# Patient Record
Sex: MALE | Race: WHITE | NOT HISPANIC OR LATINO | URBAN - METROPOLITAN AREA
[De-identification: names, ages, dates, MRNs, and addresses within clinical notes are randomized per-mention and may not be internally consistent; named-entity substitution may affect disease eponyms.]

---

## 2024-12-01 ENCOUNTER — OFFICE VISIT (OUTPATIENT)
Dept: URGENT CARE | Age: 64
End: 2024-12-01

## 2024-12-01 ENCOUNTER — ANCILLARY PROCEDURE (OUTPATIENT)
Dept: URGENT CARE | Age: 64
End: 2024-12-01

## 2024-12-01 VITALS
RESPIRATION RATE: 18 BRPM | HEART RATE: 82 BPM | DIASTOLIC BLOOD PRESSURE: 90 MMHG | OXYGEN SATURATION: 97 % | SYSTOLIC BLOOD PRESSURE: 150 MMHG | TEMPERATURE: 97.6 F

## 2024-12-01 DIAGNOSIS — S59.912A INJURY OF LEFT FOREARM AND WRIST, INITIAL ENCOUNTER: ICD-10-CM

## 2024-12-01 DIAGNOSIS — S69.92XA INJURY OF LEFT FOREARM AND WRIST, INITIAL ENCOUNTER: ICD-10-CM

## 2024-12-01 DIAGNOSIS — S52.602A CLOSED FRACTURE OF DISTAL END OF LEFT ULNA, UNSPECIFIED FRACTURE MORPHOLOGY, INITIAL ENCOUNTER: Primary | ICD-10-CM

## 2024-12-01 PROCEDURE — 73090 X-RAY EXAM OF FOREARM: CPT | Mod: LEFT SIDE | Performed by: PHYSICIAN ASSISTANT

## 2024-12-01 ASSESSMENT — ENCOUNTER SYMPTOMS
FEVER: 0
NUMBNESS: 0
JOINT SWELLING: 1
CHILLS: 0
ARTHRALGIAS: 1

## 2024-12-01 NOTE — PROGRESS NOTES
Subjective   Patient ID: Mikhail Nick is a 64 y.o. male. They present today with a chief complaint of Injury (Pt advised that he injured his left wrist 4 days prior. Pt advised no other injuries and denied prescriptions blood thinners. Pt did have previous injury wit surgery on his left wrist.).    History of Present Illness  Patient presents for left forearm/wrist injury. He states that he fell 4 days ago and injury his left arm. He doesn't recall how he landed, but has noticed bruising and swelling to his forearm. He states he has no pain at rest. He does feel pain to his ulnar wrist with turning movements of his wrist.  He denies laceration, numbness or tingling. He has chronically slightly decreased ROM after his prior crush injury in 2008 or 2009. He states this was surgically repaired with a heather. He is mainly worried that he did damage to the prior surgery.           Past Medical History  Allergies as of 12/01/2024    (No Known Allergies)       (Not in a hospital admission)       No past medical history on file.    Past Surgical History:   Procedure Laterality Date    TONSILLECTOMY  11/13/2014    Tonsillectomy With Adenoidectomy            Review of Systems  Review of Systems   Constitutional:  Negative for chills and fever.   Musculoskeletal:  Positive for arthralgias (left wrist) and joint swelling (left forearm).   Skin:         Positive for bruising and swelling, negative for laceration     Neurological:  Negative for numbness.                                  Objective    Vitals:    12/01/24 0923 12/01/24 1016   BP: (!) 165/92 150/90   Pulse: 82    Resp: 18    Temp: 36.4 °C (97.6 °F)    SpO2: 97%      No LMP for male patient.    Physical Exam  Vitals and nursing note reviewed.   Constitutional:       General: He is not in acute distress.  Musculoskeletal:      Left forearm: Swelling and edema present. No deformity, lacerations or bony tenderness.      Left wrist: Swelling and deformity present. No  lacerations or snuff box tenderness. Decreased range of motion. Normal pulse.      Comments: Patient has slightly reduced ROM in left wrist, though notes this is his baseline. He admits to pain to distal ulna with twisting movements of wrist.   Skin:     General: Skin is warm.      Capillary Refill: Capillary refill takes less than 2 seconds.   Neurological:      Mental Status: He is alert.      Comments: Sensation is intact to light touch to left forearm/wrist/hand         Procedures    Point of Care Test & Imaging Results from this visit  No results found for this visit on 12/01/24.   XR forearm left 2 views    Result Date: 12/1/2024  Interpreted By:  Home Whittington, STUDY: XR FOREARM LEFT 2 VIEWS; 12/1/2024 9:53 am   INDICATION: Fall, rule out fracture. Prior history of fracture repaired with heather.   COMPARISON: None.   ACCESSION NUMBER(S): [id number]   ORDERING CLINICIAN: PA MARTINEZ   TECHNIQUE: Two views of the  forearm were performed.   FINDINGS: Bones: The bones appear intact. Comminuted fracture distal ulna. No significant displacement. Screw fixation distal radius complete healing.   Joints: Intact.   Soft tissues: Unremarkable.       Distal ulna comminuted fracture may be acute, correlate with physical exam localized tenderness.   Signed by: Home Whittington 12/1/2024 10:11 AM Dictation workstation:   [id number]     Diagnostic study results (if any) were reviewed by Pa Martinez PA-C.    Assessment/Plan   Allergies, medications, history, and pertinent labs/EKGs/Imaging reviewed by Pa Martinez PA-C.     Medical Decision Making  MDM- History, examination, and XRAY consistent with distal ulna fracture (unsure if acute or chronic). Neurovascular status is intact. Fracture does not need to be reduced at this time.  Patient will be splinted with prefab DME, and referred to Ortho. Patient advised to present in ED if symptoms change or worsen.  Patient verbalized understanding and agrees with plan.        Orders and Diagnoses  Diagnoses and all orders for this visit:  Closed fracture of distal end of left ulna, unspecified fracture morphology, initial encounter  -     Referral to Orthopaedic Surgery; Future  Injury of left forearm and wrist, initial encounter  -     XR forearm left 2 views; Future      Medical Admin Record      Patient disposition: Home    Electronically signed by Vangie Hamilton PA-C  10:34 AM

## 2024-12-04 ENCOUNTER — OFFICE VISIT (OUTPATIENT)
Dept: ORTHOPEDIC SURGERY | Facility: HOSPITAL | Age: 64
End: 2024-12-04

## 2024-12-04 VITALS — WEIGHT: 167 LBS | BODY MASS INDEX: 22.13 KG/M2 | HEIGHT: 73 IN

## 2024-12-04 DIAGNOSIS — S52.602A CLOSED FRACTURE OF DISTAL END OF LEFT ULNA, UNSPECIFIED FRACTURE MORPHOLOGY, INITIAL ENCOUNTER: ICD-10-CM

## 2024-12-04 PROCEDURE — 99203 OFFICE O/P NEW LOW 30 MIN: CPT | Performed by: SPECIALIST

## 2024-12-04 PROCEDURE — 99213 OFFICE O/P EST LOW 20 MIN: CPT | Performed by: SPECIALIST

## 2024-12-04 PROCEDURE — 3008F BODY MASS INDEX DOCD: CPT | Performed by: SPECIALIST

## 2024-12-04 NOTE — PROGRESS NOTES
Closed fracture of distal end of left ulna, XR done 12/01/24  Patient fell 11/27/24 while in his bathroom causing injury to the wrist   Patient did have previous surgery in 2008 on the left wrist.  Patient many years ago had a fracture to the left distal radius but stated he had no pain or functional issues with the wrist prior to this recent fall.  He has been in a cock up wrist brace and states he currently has minimal pain in the wrist.  Patient is right-hand dominant.    Exam: Patient alert and oriented x 3 no acute distress.  Left wrist with diffuse swelling and ecchymosis foot neutral alignment.  Remote well-healed volar incision.  Neurovascular status intact dermis intact.  Has pain mild to palpation of the distal ulna/radial ulnar joint.  No pain up at the elbow or in the digits.  Good forearm rotation.  Flexion extension of the risk is restricted but intact with minimal pain.  No snuffbox pain.    Radiographs: Left wrist identifies a minimally displaced fracture of the distal ulna.  RU J does not appear to be subluxed.    Assessment and plan: Isolated left distal ulna fracture.  Patient was placed into a removable cast brace.  He can take it off for skin care and gentle motion.  He will follow-up with Dr. Hagen in a month with repeat left wrist radiographs.

## 2024-12-30 DIAGNOSIS — S52.602A CLOSED FRACTURE OF DISTAL END OF LEFT ULNA, UNSPECIFIED FRACTURE MORPHOLOGY, INITIAL ENCOUNTER: ICD-10-CM

## 2025-01-06 ENCOUNTER — APPOINTMENT (OUTPATIENT)
Dept: ORTHOPEDIC SURGERY | Facility: HOSPITAL | Age: 65
End: 2025-01-06

## 2025-01-06 ENCOUNTER — HOSPITAL ENCOUNTER (OUTPATIENT)
Dept: RADIOLOGY | Facility: HOSPITAL | Age: 65
Discharge: HOME | End: 2025-01-06

## 2025-01-06 DIAGNOSIS — S52.602A CLOSED FRACTURE OF DISTAL END OF LEFT ULNA, UNSPECIFIED FRACTURE MORPHOLOGY, INITIAL ENCOUNTER: ICD-10-CM

## 2025-01-06 DIAGNOSIS — S52.602A CLOSED FRACTURE OF DISTAL END OF LEFT ULNA, UNSPECIFIED FRACTURE MORPHOLOGY, INITIAL ENCOUNTER: Primary | ICD-10-CM

## 2025-01-06 PROCEDURE — 73110 X-RAY EXAM OF WRIST: CPT | Mod: LEFT SIDE | Performed by: RADIOLOGY

## 2025-01-06 PROCEDURE — 99213 OFFICE O/P EST LOW 20 MIN: CPT | Performed by: ORTHOPAEDIC SURGERY

## 2025-01-06 PROCEDURE — 73110 X-RAY EXAM OF WRIST: CPT | Mod: LT

## 2025-01-06 NOTE — PROGRESS NOTES
64 y.o. male presents today for evaluation of left wrist pain after injury. The patient reports he fell landed on his left wrist. The DOI 11/27, about 6 weeks ago. Pain is controlled. Patient reports no numbness and tingling.  Reports he broke his left wrist around 2008 and had surgery on it   Reports pain worse with use, better at rest.   Pain dull ache, sharp at times. Splint has been worn as directed.   States pain much improved, currently minimal to no pain.    Review of Systems    Constitutional: see HPI, no fever, no chills, not feeling tired, no significant weight gain or weight loss.   Eyes: No vision changes  ENT: no nosebleeds.   Cardiovascular: no chest pain.   Respiratory: no shortness of breath and no cough.   Gastrointestinal: no abdominal pain, no nausea, no vomiting and no diarrhea.   Musculoskeletal: per HPI  Neurological: no headache, no gait disturbances  Psychiatric: no depression and no sleep disturbances.   Endocrine: no muscle weakness and no muscle cramps.   Hematologic/Lymphatic: no swollen glands and no tendency for easy bruising or excessive swelling.     Patient's past medical history, past surgical history, allergies, and medications have been reviewed unless otherwise noted in the chart.     Wrist Exam  Inspection:  no evidence of infection, no erythema, no edema, no ecchymosis, Palpation:  compartments are soft, minimal to no tenderness with palpation distal ulna, Range of Motion:  full wrist and finger range of motion, Stability:  no wrist instability, Strength:  5/5 wrist and finger flexion/extension, Skin:  intact, Vascular:  capillary refill <2 seconds distally, Sensation:  sensation intact to light touch distally, Other:  no pain with palpation or motion proximally in the elbow.       Constitutional   General appearance: Alert and in no acute distress. Well developed, well nourished.    Eyes   External Eye, Conjunctiva and lids: Normal external exam - pupils were equal in size,  round, reactive to light (PERRL) with normal accommodation and extraocular movements intact (EOMI).   Ears, Nose, Mouth, and Throat   Hearing: Normal.   Neck   Neck: No neck mass was observed. Supple.   Pulmonary   Respiratory effort: No respiratory distress.   Cardiovascular   Examination of extremities: No peripheral edema.   Psychiatric   Judgment and insight: Intact.   Orientation to person, place, and time: Alert and oriented x 3.       Mood and affect: Normal.      XR -left distal ulnar fracture minimally to nondisplaced with callus formation, old distal radius fracture with intact hardware, healed    X-rays were personally reviewed by me. Radiology reports were reviewed by me as well, if available at the time.      Left distal ulnar fracture  Based on the history, physical exam and imaging studies above, the patient's presentation is consistent with the above diagnosis.  I had a long discussion with the patient regarding their presentation and the treatment options.  We discussed initial nonoperative versus operative management options as well as potential further diagnostic imaging.  We again discussed her treatment options going forward along with their associated risks and benefits. After thorough discussion, the patient has elected to proceed with conservative management. All questions were answered to the patients satisfaction who seems satisfied with the plan.  They will call the office with any questions/concerns.    Exos brace -wean out over 2 weeks  Follow-up 4 weeks x-ray

## 2025-01-06 NOTE — PROGRESS NOTES
Dr. Doss seen patient on 12/04/2024 and to follow up with Dr. Hagen in 4 weeks for a left Ulna fracture, DOI before  Thanksgiving.

## 2025-02-06 ENCOUNTER — APPOINTMENT (OUTPATIENT)
Dept: ORTHOPEDIC SURGERY | Facility: HOSPITAL | Age: 65
End: 2025-02-06